# Patient Record
Sex: FEMALE | Race: WHITE | NOT HISPANIC OR LATINO | Employment: FULL TIME | URBAN - METROPOLITAN AREA
[De-identification: names, ages, dates, MRNs, and addresses within clinical notes are randomized per-mention and may not be internally consistent; named-entity substitution may affect disease eponyms.]

---

## 2017-02-03 ENCOUNTER — SKIN CANCER EXAM (OUTPATIENT)
Dept: URBAN - METROPOLITAN AREA CLINIC 31 | Facility: CLINIC | Age: 67
Setting detail: DERMATOLOGY
End: 2017-02-03

## 2017-02-03 ENCOUNTER — RX ONLY (RX ONLY)
Age: 67
End: 2017-02-03

## 2017-02-03 PROCEDURE — 99214 OFFICE O/P EST MOD 30 MIN: CPT

## 2017-02-03 PROCEDURE — 11301 SHAVE SKIN LESION 0.6-1.0 CM: CPT

## 2017-02-03 RX ORDER — CLOBETASOL PROPIONATE 0.5 MG/G
CREAM TOPICAL
Qty: 60 | Refills: 3 | Status: DISCONTINUED
Start: 2017-02-03 | End: 2018-02-09

## 2017-06-10 ENCOUNTER — SEE NOTE (OUTPATIENT)
Dept: URBAN - METROPOLITAN AREA CLINIC 34 | Facility: CLINIC | Age: 67
Setting detail: DERMATOLOGY
End: 2017-06-10

## 2017-06-10 PROCEDURE — 11200 RMVL SKIN TAGS UP TO&INC 15: CPT

## 2017-06-10 PROCEDURE — 11301 SHAVE SKIN LESION 0.6-1.0 CM: CPT

## 2018-02-09 ENCOUNTER — RX ONLY (RX ONLY)
Age: 68
End: 2018-02-09

## 2018-02-09 ENCOUNTER — SKIN CANCER EXAM (OUTPATIENT)
Dept: URBAN - METROPOLITAN AREA CLINIC 31 | Facility: CLINIC | Age: 68
Setting detail: DERMATOLOGY
End: 2018-02-09

## 2018-02-09 PROBLEM — L57.0 ACTINIC KERATOSIS: Status: ACTIVE | Noted: 2018-02-09

## 2018-02-09 PROBLEM — L57.0 ACTINIC KERATOSIS: Status: RESOLVED | Noted: 2018-02-09

## 2018-02-09 PROCEDURE — 99214 OFFICE O/P EST MOD 30 MIN: CPT

## 2018-02-09 PROCEDURE — 17000 DESTRUCT PREMALG LESION: CPT

## 2018-02-09 RX ORDER — CLOBETASOL PROPIONATE 0.5 MG/G
1 APPLICATION CREAM TOPICAL BID
Qty: 60 | Refills: 3
Start: 2018-02-09

## 2018-03-10 ENCOUNTER — WORRISOME GROWTH - SEE NOTE (OUTPATIENT)
Dept: URBAN - METROPOLITAN AREA CLINIC 34 | Facility: CLINIC | Age: 68
Setting detail: DERMATOLOGY
End: 2018-03-10

## 2018-03-10 PROCEDURE — 11301 SHAVE SKIN LESION 0.6-1.0 CM: CPT

## 2018-05-12 ENCOUNTER — FOLLOW UP (OUTPATIENT)
Dept: URBAN - METROPOLITAN AREA CLINIC 34 | Facility: CLINIC | Age: 68
Setting detail: DERMATOLOGY
End: 2018-05-12

## 2018-05-12 PROBLEM — L82.0 INFLAMED SEBORRHEIC KERATOSIS: Status: ACTIVE | Noted: 2018-05-12

## 2018-05-12 PROBLEM — L82.0 INFLAMED SEBORRHEIC KERATOSIS: Status: RESOLVED | Noted: 2018-05-12

## 2018-05-12 PROCEDURE — 17110 DESTRUCT B9 LESION 1-14: CPT

## 2019-02-09 ENCOUNTER — SKIN CANCER EXAM (OUTPATIENT)
Dept: URBAN - METROPOLITAN AREA CLINIC 34 | Facility: CLINIC | Age: 69
Setting detail: DERMATOLOGY
End: 2019-02-09

## 2019-02-09 DIAGNOSIS — L82.0 INFLAMED SEBORRHEIC KERATOSIS: ICD-10-CM

## 2019-02-09 PROBLEM — L82.1 OTHER SEBORRHEIC KERATOSIS: Status: RESOLVED | Noted: 2019-02-09

## 2019-02-09 PROBLEM — L82.1 OTHER SEBORRHEIC KERATOSIS: Status: ACTIVE | Noted: 2019-02-09

## 2019-02-09 PROCEDURE — 99214 OFFICE O/P EST MOD 30 MIN: CPT

## 2019-02-23 ENCOUNTER — SEE NOTE (OUTPATIENT)
Dept: URBAN - METROPOLITAN AREA CLINIC 34 | Facility: CLINIC | Age: 69
Setting detail: DERMATOLOGY
End: 2019-02-23

## 2019-02-23 DIAGNOSIS — B07.9 VIRAL WART, UNSPECIFIED: ICD-10-CM

## 2019-02-23 PROBLEM — L82.0 INFLAMED SEBORRHEIC KERATOSIS: Status: ACTIVE | Noted: 2019-02-23

## 2019-02-23 PROBLEM — L82.0 INFLAMED SEBORRHEIC KERATOSIS: Status: RESOLVED | Noted: 2019-02-23

## 2019-02-23 PROCEDURE — 17110 DESTRUCT B9 LESION 1-14: CPT

## 2019-02-23 PROCEDURE — 11104 PUNCH BX SKIN SINGLE LESION: CPT

## 2019-03-09 ENCOUNTER — RX ONLY (RX ONLY)
Age: 69
End: 2019-03-09

## 2019-03-09 ENCOUNTER — SUTURE REMOVAL (OUTPATIENT)
Dept: URBAN - METROPOLITAN AREA CLINIC 34 | Facility: CLINIC | Age: 69
Setting detail: DERMATOLOGY
End: 2019-03-09

## 2019-03-09 DIAGNOSIS — L57.9 SKIN CHANGES DUE TO CHRONIC EXPOSURE TO NONIONIZING RADIATION, UNSPECIFIED: ICD-10-CM

## 2019-03-09 DIAGNOSIS — D23.9 OTHER BENIGN NEOPLASM OF SKIN, UNSPECIFIED: ICD-10-CM

## 2019-03-09 PROCEDURE — 99213 OFFICE O/P EST LOW 20 MIN: CPT

## 2019-03-09 PROCEDURE — 99024 POSTOP FOLLOW-UP VISIT: CPT

## 2019-03-09 RX ORDER — DESOXIMETASONE 2.5 MG/G
1 APPLICATION CREAM TOPICAL BID
Qty: 100 | Refills: 3 | Status: DISCONTINUED
Start: 2019-03-09 | End: 2019-03-11

## 2019-03-11 ENCOUNTER — RX ONLY (RX ONLY)
Age: 69
End: 2019-03-11

## 2019-03-11 RX ORDER — DESOXIMETASONE 2.5 MG/G
1 APPLICATION CREAM TOPICAL BID
Qty: 100 | Refills: 3
Start: 2019-03-11

## 2019-10-29 ENCOUNTER — APPOINTMENT (RX ONLY)
Dept: URBAN - NONMETROPOLITAN AREA CLINIC 13 | Facility: CLINIC | Age: 69
Setting detail: DERMATOLOGY
End: 2019-10-29

## 2019-10-29 DIAGNOSIS — Z41.9 ENCOUNTER FOR PROCEDURE FOR PURPOSES OTHER THAN REMEDYING HEALTH STATE, UNSPECIFIED: ICD-10-CM

## 2019-10-29 PROCEDURE — ? BOTOX

## 2020-05-04 NOTE — PROCEDURE: BOTOX
Detail Level: Zone
NEPHROLOGIST UPDATED. 

 SEE MD ORDERS/ NOTES.
Inferior Lateral Orbicularis Oculi Units: 0
Show Additional Area 2: Yes
Additional Area 2 Location: masseter, L and R
Additional Area 5 Location: brow
Show Ucl Units: No
Consent: Written consent obtained. Risks include but not limited to lid/brow ptosis, bruising, swelling, diplopia, temporary effect, incomplete chemical denervation.
Additional Area 4 Location: lip upper
Forehead Units: 5
Lot #: 
Expiration Date (Month Year): 2/22
Additional Area 1 Location: vedanimo
Additional Area 6 Location: chin
Post-Care Instructions: Patient instructed to not lie down for 4 hours and limit physical activity for 24 hours. Patient instructed not to travel by airplane for 48 hours.
Dilution (U/0.1 Cc): 1
Additional Area 3 Location: bunny lines

## 2020-07-27 ENCOUNTER — OFFICE VISIT (OUTPATIENT)
Dept: URBAN - METROPOLITAN AREA CLINIC 96 | Facility: CLINIC | Age: 70
End: 2020-07-27

## 2020-07-27 RX ORDER — LISINOPRIL 20 MG/1
TABLET ORAL
Qty: 0 | Refills: 0 | COMMUNITY
Start: 1900-01-01

## 2020-07-27 RX ORDER — MELOXICAM 7.5 MG/1
TABLET ORAL
Qty: 0 | Refills: 0 | COMMUNITY
Start: 1900-01-01

## 2020-07-27 RX ORDER — VALACYCLOVIR HYDROCHLORIDE 500 MG/1
TABLET, FILM COATED ORAL
Qty: 0 | Refills: 0 | COMMUNITY
Start: 1900-01-01

## 2020-07-27 RX ORDER — METFORMIN HYDROCHLORIDE 1000 MG/1
TAKE 1 TABLET (1,000 MG) BY ORAL ROUTE 2 TIMES PER DAY WITH MORNING AND EVENING MEALS TABLET, COATED ORAL 2
Qty: 0 | Refills: 0 | COMMUNITY
Start: 1900-01-01

## 2020-07-27 RX ORDER — LEVOTHYROXINE SODIUM 75 UG/1
TABLET ORAL
Qty: 0 | Refills: 0 | COMMUNITY
Start: 1900-01-01

## 2020-07-27 RX ORDER — ATENOLOL AND CHLORTHALIDONE 100; 25 MG/1; MG/1
TAKE 1 TABLET BY ORAL ROUTE ONCE DAILY TABLET ORAL 1
Qty: 0 | Refills: 0 | COMMUNITY
Start: 1900-01-01

## 2020-07-27 RX ORDER — SIMVASTATIN 20 MG/1
TABLET, FILM COATED ORAL
Qty: 0 | Refills: 0 | COMMUNITY
Start: 1900-01-01

## 2020-07-27 NOTE — HPI-TODAY'S VISIT:
This is a 70-year-old female referred by Dr. Kezia Walsh for dysphasia.  Patient was seen by me in 2016 for a family history of colon cancer as well as anemia.  Her mother  at 52 of colon cancer and a maternal uncle had colon cancer in his 50s.  Pt had a colonoscopy on 2016 and this revealed internal hemorrhoids but otherwise normal exam.  She had an upper endoscopy on the same day due to anemia and heartburn and this showed some mild erythema of the stomach.  Biopsy showed no evidence of celiac disease, chronic inactive gastritis but no H. pylori and esophageal biopsy showed some patchy inflammation.  Patient then underwent a PillCam study on 2016 for completion of her GI work-up and this showed no obvious cause of iron deficiency anemia.  There was a mild submucosal bulge in the distal small bowel but the significance of this was unclear.  Patient underwent a CT enterography on 2016 to evaluate the PillCam study finding and this showed no abnormalities of the small bowel.  She also underwent an MRA and MRI of the abdomen on 2017 because of abnormal liver tests and a liver lesion seen on an outside CAT scan and this showed a hemangioma the liver as well as a tiny cyst in the liver but no suspicious lesions.

## 2020-11-09 ENCOUNTER — LAB OUTSIDE AN ENCOUNTER (OUTPATIENT)
Dept: URBAN - METROPOLITAN AREA CLINIC 96 | Facility: CLINIC | Age: 70
End: 2020-11-09

## 2020-11-09 ENCOUNTER — OFFICE VISIT (OUTPATIENT)
Dept: URBAN - METROPOLITAN AREA CLINIC 96 | Facility: CLINIC | Age: 70
End: 2020-11-09
Payer: MEDICARE

## 2020-11-09 DIAGNOSIS — R13.10 ESOPHAGEAL DYSPHAGIA: ICD-10-CM

## 2020-11-09 DIAGNOSIS — Z80.0 FAMILY HISTORY OF COLON CANCER: ICD-10-CM

## 2020-11-09 DIAGNOSIS — D18.03 LIVER HEMANGIOMA: ICD-10-CM

## 2020-11-09 DIAGNOSIS — R19.7 DIARRHEA, UNSPECIFIED TYPE: ICD-10-CM

## 2020-11-09 DIAGNOSIS — K59.00 CONSTIPATION, UNSPECIFIED CONSTIPATION TYPE: ICD-10-CM

## 2020-11-09 DIAGNOSIS — Z86.2 HISTORY OF ANEMIA: ICD-10-CM

## 2020-11-09 DIAGNOSIS — K21.9 GASTROESOPHAGEAL REFLUX DISEASE WITHOUT ESOPHAGITIS: ICD-10-CM

## 2020-11-09 DIAGNOSIS — R19.4 CHANGE IN BOWEL HABITS: ICD-10-CM

## 2020-11-09 DIAGNOSIS — Z86.73 HISTORY OF STROKE: ICD-10-CM

## 2020-11-09 PROCEDURE — 3017F COLORECTAL CA SCREEN DOC REV: CPT | Performed by: INTERNAL MEDICINE

## 2020-11-09 PROCEDURE — G8420 CALC BMI NORM PARAMETERS: HCPCS | Performed by: INTERNAL MEDICINE

## 2020-11-09 PROCEDURE — G8427 DOCREV CUR MEDS BY ELIG CLIN: HCPCS | Performed by: INTERNAL MEDICINE

## 2020-11-09 PROCEDURE — G8482 FLU IMMUNIZE ORDER/ADMIN: HCPCS | Performed by: INTERNAL MEDICINE

## 2020-11-09 PROCEDURE — G9903 PT SCRN TBCO ID AS NON USER: HCPCS | Performed by: INTERNAL MEDICINE

## 2020-11-09 PROCEDURE — 99204 OFFICE O/P NEW MOD 45 MIN: CPT | Performed by: INTERNAL MEDICINE

## 2020-11-09 RX ORDER — METFORMIN HYDROCHLORIDE 1000 MG/1
TAKE 1 TABLET (1,000 MG) BY ORAL ROUTE 2 TIMES PER DAY WITH MORNING AND EVENING MEALS TABLET, COATED ORAL 2
Qty: 0 | Refills: 0 | Status: ACTIVE | COMMUNITY
Start: 1900-01-01

## 2020-11-09 RX ORDER — VALACYCLOVIR HYDROCHLORIDE 500 MG/1
TABLET, FILM COATED ORAL
Qty: 0 | Refills: 0 | Status: ACTIVE | COMMUNITY
Start: 1900-01-01

## 2020-11-09 RX ORDER — MELOXICAM 7.5 MG/1
TABLET ORAL
Qty: 0 | Refills: 0 | Status: ACTIVE | COMMUNITY
Start: 1900-01-01

## 2020-11-09 RX ORDER — MELOXICAM 7.5 MG/1
1 TABLET TABLET ORAL ONCE A DAY
Status: DISCONTINUED | COMMUNITY

## 2020-11-09 RX ORDER — ONDANSETRON HYDROCHLORIDE 4 MG/1
1 TABLET AS NEEDED TABLET, FILM COATED ORAL ONCE A DAY
Qty: 2 | Refills: 0 | OUTPATIENT
Start: 2020-11-09

## 2020-11-09 RX ORDER — LEVOTHYROXINE SODIUM 75 UG/1
TABLET ORAL
Qty: 0 | Refills: 0 | Status: ACTIVE | COMMUNITY
Start: 1900-01-01

## 2020-11-09 RX ORDER — MULTIVIT-MIN/IRON/FOLIC ACID/K 18-600-40
AS DIRECTED CAPSULE ORAL
Status: ACTIVE | COMMUNITY

## 2020-11-09 RX ORDER — CITALOPRAM HYDROBROMIDE 10 MG/1
1 TABLET TABLET, FILM COATED ORAL ONCE A DAY
Status: ACTIVE | COMMUNITY

## 2020-11-09 RX ORDER — POLYETHYLENE GLYOCOL 3350, SODIUM CHLORIDE, SODIUM BICARBONATE AND POTASSIUM CHLORIDE 420; 11.2; 5.72; 1.48 G/4L; G/4L; G/4L; G/4L
AS DIRECTED POWDER, FOR SOLUTION NASOGASTRIC; ORAL AS DIRECTED
Qty: 420 GRAM | Refills: 0 | OUTPATIENT
Start: 2020-11-09 | End: 2020-11-10

## 2020-11-09 RX ORDER — SIMVASTATIN 20 MG/1
TABLET, FILM COATED ORAL
Qty: 0 | Refills: 0 | Status: DISCONTINUED | COMMUNITY
Start: 1900-01-01

## 2020-11-09 RX ORDER — CLOPIDOGREL 75 MG/1
1 TABLET TABLET, FILM COATED ORAL ONCE A DAY
Status: ACTIVE | COMMUNITY

## 2020-11-09 RX ORDER — LISINOPRIL 20 MG/1
TABLET ORAL
Qty: 0 | Refills: 0 | Status: ACTIVE | COMMUNITY
Start: 1900-01-01

## 2020-11-09 RX ORDER — ATENOLOL AND CHLORTHALIDONE 100; 25 MG/1; MG/1
TAKE 1 TABLET BY ORAL ROUTE ONCE DAILY TABLET ORAL 1
Qty: 0 | Refills: 0 | Status: DISCONTINUED | COMMUNITY
Start: 1900-01-01

## 2020-11-09 NOTE — HPI-TODAY'S VISIT:
This is a 70-year-old female referred by Dr. Harrison for dysphagia, change in bowels and hx of anemia.  Patient was last seen in the office in January 2017 at which time she had a GI work-up of anemia.  Patient's mother had colon cancer in her early 50s and maternal uncle had colon cancer as well in his 50s.  She was found to be anemic with hemoglobin of 10 and a low MCV of 78.  She also complained of some heartburn.  Patient also had chronic constipation.  Work-up done included an EGD on November 28, 2016 that showed chronic inactive gastritis but no H. pylori and duodenal biopsies were negative.  There was some patchy inflammation of the esophagus on biopsies.  Colonoscopy showed internal hemorrhoids otherwise was normal.  PillCam study done on December 7, 2016 showed a questionable submucosal bulge of the distal small bowel.  To further evaluate this, CT enterography was done on December 27, 2016 that showed a hemangioma of the liver versus adenoma versus FNH.  There was no obvious source of anemia found on the GI work-up.  I went over those results with the patient and her  when she followed up and did recommend an MRI to confirm that the liver lesion on CT was benign.  I also recommended a PPI for GERD and a follow-up colonoscopy in 5 years due to her strong family history of colon cancer.  I also recommended patient return to Dr. Walsh for referral to see hematologist for further work-up of anemia.  MRI of the abdomen with and without contrast was done on March 20, 2017 and revealed small flash filling hemangioma of the liver and a tiny cyst in the left hepatic lobe but no suspicious lesions seen.  Pt had a minor stroke 4 yrs ago and is on plavix. Pt just saw Dr Peralta recently, she is about to start IV iron. Pt was told she needs to have repeat egd/colonscopy. Pt is here however because she finds she does not have trouble swallowing, but she feels like both liquids and solids stick in upper chest area. Pt also c/o n/v for about 6 months that comes and goes. Pt also has change in bowels and has both constipation and diarrhea lately and is not sure why. Pt has some cardiac rhythm issues and sees [facility], [phone]- she sees Dr Edward Mattson ([place]).

## 2020-12-15 ENCOUNTER — OFFICE VISIT (OUTPATIENT)
Dept: URBAN - METROPOLITAN AREA MEDICAL CENTER 28 | Facility: MEDICAL CENTER | Age: 70
End: 2020-12-15
Payer: MEDICARE

## 2020-12-15 DIAGNOSIS — Z80.0 FAMILY HISTORY MALIGNANT NEOPLASM OF BILIARY TRACT: ICD-10-CM

## 2020-12-15 DIAGNOSIS — R13.19 CERVICAL DYSPHAGIA: ICD-10-CM

## 2020-12-15 DIAGNOSIS — D50.9 ANEMIA, IRON DEFICIENCY: ICD-10-CM

## 2020-12-15 DIAGNOSIS — K63.5 BENIGN COLON POLYP: ICD-10-CM

## 2020-12-15 DIAGNOSIS — K31.89 ACQUIRED DEFORMITY OF DUODENUM: ICD-10-CM

## 2020-12-15 PROCEDURE — G9935 CANC NOT DETECTD DURING SRCN: HCPCS | Performed by: INTERNAL MEDICINE

## 2020-12-15 PROCEDURE — 43239 EGD BIOPSY SINGLE/MULTIPLE: CPT | Performed by: INTERNAL MEDICINE

## 2020-12-15 PROCEDURE — 45380 COLONOSCOPY AND BIOPSY: CPT | Performed by: INTERNAL MEDICINE

## 2020-12-16 ENCOUNTER — TELEPHONE ENCOUNTER (OUTPATIENT)
Dept: URBAN - METROPOLITAN AREA CLINIC 96 | Facility: CLINIC | Age: 70
End: 2020-12-16

## 2021-01-05 ENCOUNTER — WEB ENCOUNTER (OUTPATIENT)
Dept: URBAN - METROPOLITAN AREA CLINIC 96 | Facility: CLINIC | Age: 71
End: 2021-01-05

## 2021-01-06 ENCOUNTER — LAB OUTSIDE AN ENCOUNTER (OUTPATIENT)
Dept: URBAN - METROPOLITAN AREA CLINIC 96 | Facility: CLINIC | Age: 71
End: 2021-01-06

## 2021-01-06 ENCOUNTER — OFFICE VISIT (OUTPATIENT)
Dept: URBAN - METROPOLITAN AREA CLINIC 96 | Facility: CLINIC | Age: 71
End: 2021-01-06
Payer: MEDICARE

## 2021-01-06 DIAGNOSIS — K21.9 GASTROESOPHAGEAL REFLUX DISEASE WITHOUT ESOPHAGITIS: ICD-10-CM

## 2021-01-06 DIAGNOSIS — Z80.0 FAMILY HISTORY OF COLON CANCER: ICD-10-CM

## 2021-01-06 DIAGNOSIS — K59.00 CONSTIPATION, UNSPECIFIED CONSTIPATION TYPE: ICD-10-CM

## 2021-01-06 DIAGNOSIS — Z86.73 HISTORY OF STROKE: ICD-10-CM

## 2021-01-06 DIAGNOSIS — Z86.2 HISTORY OF ANEMIA: ICD-10-CM

## 2021-01-06 DIAGNOSIS — D18.03 LIVER HEMANGIOMA: ICD-10-CM

## 2021-01-06 DIAGNOSIS — R19.7 DIARRHEA, UNSPECIFIED TYPE: ICD-10-CM

## 2021-01-06 DIAGNOSIS — R13.10 ESOPHAGEAL DYSPHAGIA: ICD-10-CM

## 2021-01-06 DIAGNOSIS — R19.4 CHANGE IN BOWEL HABITS: ICD-10-CM

## 2021-01-06 DIAGNOSIS — D50.9 IRON DEFICIENCY ANEMIA, UNSPECIFIED IRON DEFICIENCY ANEMIA TYPE: ICD-10-CM

## 2021-01-06 PROCEDURE — G8482 FLU IMMUNIZE ORDER/ADMIN: HCPCS | Performed by: INTERNAL MEDICINE

## 2021-01-06 PROCEDURE — G9903 PT SCRN TBCO ID AS NON USER: HCPCS | Performed by: INTERNAL MEDICINE

## 2021-01-06 PROCEDURE — G8427 DOCREV CUR MEDS BY ELIG CLIN: HCPCS | Performed by: INTERNAL MEDICINE

## 2021-01-06 PROCEDURE — 99204 OFFICE O/P NEW MOD 45 MIN: CPT | Performed by: INTERNAL MEDICINE

## 2021-01-06 PROCEDURE — 3017F COLORECTAL CA SCREEN DOC REV: CPT | Performed by: INTERNAL MEDICINE

## 2021-01-06 PROCEDURE — G8420 CALC BMI NORM PARAMETERS: HCPCS | Performed by: INTERNAL MEDICINE

## 2021-01-06 RX ORDER — ONDANSETRON HYDROCHLORIDE 4 MG/1
1 TABLET AS NEEDED TABLET, FILM COATED ORAL ONCE A DAY
Qty: 2 | Refills: 0 | Status: ACTIVE | COMMUNITY
Start: 2020-11-09

## 2021-01-06 RX ORDER — CLOPIDOGREL 75 MG/1
1 TABLET TABLET, FILM COATED ORAL ONCE A DAY
Status: ACTIVE | COMMUNITY

## 2021-01-06 RX ORDER — LEVOTHYROXINE SODIUM 75 UG/1
TABLET ORAL
Qty: 0 | Refills: 0 | Status: ACTIVE | COMMUNITY
Start: 1900-01-01

## 2021-01-06 RX ORDER — CITALOPRAM HYDROBROMIDE 10 MG/1
1 TABLET TABLET, FILM COATED ORAL ONCE A DAY
Status: ACTIVE | COMMUNITY

## 2021-01-06 RX ORDER — MULTIVIT-MIN/IRON/FOLIC ACID/K 18-600-40
AS DIRECTED CAPSULE ORAL
Status: ACTIVE | COMMUNITY

## 2021-01-06 RX ORDER — LISINOPRIL 20 MG/1
TABLET ORAL
Qty: 0 | Refills: 0 | Status: ACTIVE | COMMUNITY
Start: 1900-01-01

## 2021-01-06 RX ORDER — MELOXICAM 7.5 MG/1
TABLET ORAL
Qty: 0 | Refills: 0 | Status: ACTIVE | COMMUNITY
Start: 1900-01-01

## 2021-01-06 RX ORDER — VALACYCLOVIR HYDROCHLORIDE 500 MG/1
TABLET, FILM COATED ORAL
Qty: 0 | Refills: 0 | Status: ACTIVE | COMMUNITY
Start: 1900-01-01

## 2021-01-06 RX ORDER — METFORMIN HYDROCHLORIDE 1000 MG/1
TAKE 1 TABLET (1,000 MG) BY ORAL ROUTE 2 TIMES PER DAY WITH MORNING AND EVENING MEALS TABLET, COATED ORAL 2
Qty: 0 | Refills: 0 | Status: ACTIVE | COMMUNITY
Start: 1900-01-01

## 2021-01-06 NOTE — HPI-TODAY'S VISIT:
This is a 70-year-old female referred by Dr. Harrison for dysphagia, change in bowels and hx of anemia.  Patient was last seen in the office in January 2017 at which time she had a GI work-up of anemia.  Patient's mother had colon cancer in her early 50s and maternal uncle had colon cancer as well in his 50s.  She was found to be anemic with hemoglobin of 10 and a low MCV of 78.  She also complained of some heartburn.  Patient also had chronic constipation.  Work-up done included an EGD on November 28, 2016 that showed chronic inactive gastritis but no H. pylori and duodenal biopsies were negative.  There was some patchy inflammation of the esophagus on biopsies.  Colonoscopy showed internal hemorrhoids otherwise was normal.  PillCam study done on December 7, 2016 showed a questionable submucosal bulge of the distal small bowel.  To further evaluate this, CT enterography was done on December 27, 2016 that showed a hemangioma of the liver versus adenoma versus FNH.  There was no obvious source of anemia found on the GI work-up.  I went over those results with the patient and her  when she followed up and did recommend an MRI to confirm that the liver lesion on CT was benign.  I also recommended a PPI for GERD and a follow-up colonoscopy in 5 years due to her strong family history of colon cancer.  I also recommended patient return to Dr. Walsh for referral to see hematologist for further work-up of anemia.  MRI of the abdomen with and without contrast was done on March 20, 2017 and revealed small flash filling hemangioma of the liver and a tiny cyst in the left hepatic lobe but no suspicious lesions seen. Pt had a minor stroke 4 yrs ago and is on plavix. Pt just saw Dr Peralta recently, she is about to start IV iron. Pt was told she needs to have repeat egd/colonscopy. Pt is here however because she finds she does not have trouble swallowing, but she feels like both liquids and solids stick in upper chest area. Pt also c/o n/v for about 6 months that comes and goes. Pt also has change in bowels and has both constipation and diarrhea lately and is not sure why. Pt has some cardiac rhythm issues and sees [facility], [phone]- she sees Dr Leon "Francisca" Srinivasan.  Pt had colon and EGD on 12/15/20. Colon- sm int hemorrhoids, one 4mm transverse colon polyp removed and redundant colon. EGD- small 1-2cm hh, antral gastritis. Biopsies- no celiac, reactive gastropathy no H pylori, neg esophageal biopsies, hyperplastic colon polyp. F/u colon rec in 12/2025. Pt has had 2 iron infusions since last visit. Will have bloodwork with Dr Harrison next week. Pt denies any reflux or heartburn. Pt has constipation long term but no worse on iron. No diarrhea. No wt loss, no n/v or loss os appetite. She had her stroke 4 yrs ago and does not eat as much as she did.

## 2021-02-27 ENCOUNTER — SKIN CANCER EXAM (OUTPATIENT)
Dept: URBAN - METROPOLITAN AREA CLINIC 34 | Facility: CLINIC | Age: 71
Setting detail: DERMATOLOGY
End: 2021-02-27

## 2021-02-27 DIAGNOSIS — L29.8 OTHER PRURITUS: ICD-10-CM

## 2021-02-27 DIAGNOSIS — L30.4 ERYTHEMA INTERTRIGO: ICD-10-CM

## 2021-02-27 DIAGNOSIS — D22.5 MELANOCYTIC NEVI OF TRUNK: ICD-10-CM

## 2021-02-27 PROBLEM — L82.0 INFLAMED SEBORRHEIC KERATOSIS: Status: RESOLVED | Noted: 2021-02-27

## 2021-02-27 PROBLEM — L82.0 INFLAMED SEBORRHEIC KERATOSIS: Status: ACTIVE | Noted: 2021-02-27

## 2021-02-27 PROBLEM — L82.1 OTHER SEBORRHEIC KERATOSIS: Status: RESOLVED | Noted: 2021-02-27

## 2021-02-27 PROBLEM — L82.1 OTHER SEBORRHEIC KERATOSIS: Status: ACTIVE | Noted: 2021-02-27

## 2021-02-27 PROCEDURE — 11200 RMVL SKIN TAGS UP TO&INC 15: CPT

## 2021-02-27 PROCEDURE — 17110 DESTRUCT B9 LESION 1-14: CPT

## 2021-02-27 PROCEDURE — 11301 SHAVE SKIN LESION 0.6-1.0 CM: CPT

## 2021-02-27 PROCEDURE — 99213 OFFICE O/P EST LOW 20 MIN: CPT

## 2022-03-05 ENCOUNTER — SKIN CANCER EXAM (OUTPATIENT)
Dept: URBAN - METROPOLITAN AREA CLINIC 34 | Facility: CLINIC | Age: 72
Setting detail: DERMATOLOGY
End: 2022-03-05

## 2022-03-05 DIAGNOSIS — L82.0 INFLAMED SEBORRHEIC KERATOSIS: ICD-10-CM

## 2022-03-05 PROBLEM — L82.1 OTHER SEBORRHEIC KERATOSIS: Status: ACTIVE | Noted: 2022-03-05

## 2022-03-05 PROBLEM — L82.1 OTHER SEBORRHEIC KERATOSIS: Status: RESOLVED | Noted: 2022-03-05

## 2022-03-05 PROCEDURE — 99213 OFFICE O/P EST LOW 20 MIN: CPT

## 2022-09-26 ENCOUNTER — WEB ENCOUNTER (OUTPATIENT)
Dept: URBAN - METROPOLITAN AREA CLINIC 96 | Facility: CLINIC | Age: 72
End: 2022-09-26

## 2022-09-26 ENCOUNTER — OFFICE VISIT (OUTPATIENT)
Dept: URBAN - METROPOLITAN AREA CLINIC 96 | Facility: CLINIC | Age: 72
End: 2022-09-26
Payer: MEDICARE

## 2022-09-26 VITALS
HEIGHT: 65 IN | TEMPERATURE: 97.9 F | DIASTOLIC BLOOD PRESSURE: 87 MMHG | BODY MASS INDEX: 23.99 KG/M2 | HEART RATE: 75 BPM | SYSTOLIC BLOOD PRESSURE: 148 MMHG | WEIGHT: 144 LBS

## 2022-09-26 DIAGNOSIS — R19.7 DIARRHEA, UNSPECIFIED TYPE: ICD-10-CM

## 2022-09-26 DIAGNOSIS — D18.03 LIVER HEMANGIOMA: ICD-10-CM

## 2022-09-26 DIAGNOSIS — Z86.2 HISTORY OF ANEMIA: ICD-10-CM

## 2022-09-26 DIAGNOSIS — K21.9 GASTROESOPHAGEAL REFLUX DISEASE WITHOUT ESOPHAGITIS: ICD-10-CM

## 2022-09-26 DIAGNOSIS — K59.00 CONSTIPATION, UNSPECIFIED CONSTIPATION TYPE: ICD-10-CM

## 2022-09-26 DIAGNOSIS — D50.9 IRON DEFICIENCY ANEMIA, UNSPECIFIED IRON DEFICIENCY ANEMIA TYPE: ICD-10-CM

## 2022-09-26 DIAGNOSIS — R13.10 ESOPHAGEAL DYSPHAGIA: ICD-10-CM

## 2022-09-26 DIAGNOSIS — R19.4 CHANGE IN BOWEL HABITS: ICD-10-CM

## 2022-09-26 DIAGNOSIS — Z86.73 HISTORY OF STROKE: ICD-10-CM

## 2022-09-26 DIAGNOSIS — Z80.0 FAMILY HISTORY OF COLON CANCER: ICD-10-CM

## 2022-09-26 PROBLEM — 275538002: Status: ACTIVE | Noted: 2020-11-08

## 2022-09-26 PROBLEM — 266435005: Status: ACTIVE | Noted: 2020-11-08

## 2022-09-26 PROBLEM — 40890009: Status: ACTIVE | Noted: 2020-11-08

## 2022-09-26 PROBLEM — 87522002: Status: ACTIVE | Noted: 2021-01-06

## 2022-09-26 PROCEDURE — 99204 OFFICE O/P NEW MOD 45 MIN: CPT | Performed by: INTERNAL MEDICINE

## 2022-09-26 RX ORDER — AMLODIPINE BESYLATE 5 MG/1
1 TABLET TABLET ORAL ONCE A DAY
Status: ACTIVE | COMMUNITY

## 2022-09-26 RX ORDER — ONDANSETRON HYDROCHLORIDE 4 MG/1
1 TABLET AS NEEDED TABLET, FILM COATED ORAL ONCE A DAY
Qty: 2 | Refills: 0 | Status: ON HOLD | COMMUNITY
Start: 2020-11-09

## 2022-09-26 RX ORDER — MULTIVIT-MIN/IRON/FOLIC ACID/K 18-600-40
AS DIRECTED CAPSULE ORAL
Status: ACTIVE | COMMUNITY

## 2022-09-26 RX ORDER — CLOPIDOGREL 75 MG/1
1 TABLET TABLET, FILM COATED ORAL ONCE A DAY
Status: ACTIVE | COMMUNITY

## 2022-09-26 RX ORDER — CHOLECALCIFEROL (VITAMIN D3) 50 MCG
1 TABLET TABLET ORAL ONCE A DAY
Refills: 0 | Status: ACTIVE | COMMUNITY
Start: 1900-01-01

## 2022-09-26 RX ORDER — EMPAGLIFLOZIN 10 MG/1
1 TABLET TABLET, FILM COATED ORAL ONCE A DAY
Status: ACTIVE | COMMUNITY

## 2022-09-26 RX ORDER — LISINOPRIL 20 MG/1
TABLET ORAL
Qty: 0 | Refills: 0 | Status: ACTIVE | COMMUNITY
Start: 1900-01-01

## 2022-09-26 RX ORDER — CALCIUM CARBONATE 500(1250)
1 TABLET WITH MEALS TABLET ORAL ONCE A DAY
Status: ACTIVE | COMMUNITY

## 2022-09-26 RX ORDER — VALACYCLOVIR HYDROCHLORIDE 500 MG/1
TABLET, FILM COATED ORAL
Qty: 0 | Refills: 0 | Status: ACTIVE | COMMUNITY
Start: 1900-01-01

## 2022-09-26 RX ORDER — METFORMIN HYDROCHLORIDE 1000 MG/1
TAKE 1 TABLET (1,000 MG) BY ORAL ROUTE 2 TIMES PER DAY WITH MORNING AND EVENING MEALS TABLET, COATED ORAL 2
Qty: 0 | Refills: 0 | Status: ACTIVE | COMMUNITY
Start: 1900-01-01

## 2022-09-26 RX ORDER — LEVOTHYROXINE SODIUM 75 UG/1
TABLET ORAL
Qty: 0 | Refills: 0 | Status: ACTIVE | COMMUNITY
Start: 1900-01-01

## 2022-09-26 NOTE — HPI-TODAY'S VISIT:
This is a 72-year-old female referred by Dr. Harrison for diarrhea.  Patient was seen in the office in 2021 and prior in January 2017 at which time she had a GI work-up of anemia.  Patient's mother had colon cancer in her early 50s and maternal uncle had colon cancer as well in his 50s.  She was found to be anemic with hemoglobin of 10 and a low MCV of 78.  She also complained of some heartburn.  Patient also had chronic constipation.  Work-up done included an EGD on November 28, 2016 that showed chronic inactive gastritis but no H. pylori and duodenal biopsies were negative.  There was some patchy inflammation of the esophagus on biopsies.  Colonoscopy showed internal hemorrhoids otherwise was normal.  PillCam study done on December 7, 2016 showed a questionable submucosal bulge of the distal small bowel.  To further evaluate this, CT enterography was done on December 27, 2016 that showed a hemangioma of the liver versus adenoma versus FNH.  There was no obvious source of anemia found on the GI work-up.  I went over those results with the patient and her  when she followed up and did recommend an MRI to confirm that the liver lesion on CT was benign.  I also recommended a PPI for GERD and a follow-up colonoscopy in 5 years due to her strong family history of colon cancer.  I also recommended patient return to Dr. Walsh for referral to see hematologist for further work-up of anemia.  MRI of the abdomen with and without contrast was done on March 20, 2017 and revealed small flash filling hemangioma of the liver and a tiny cyst in the left hepatic lobe but no suspicious lesions seen. Pt had a minor stroke 4 yrs ago and is on plavix. Pt just saw Dr Peralta recently, she is about to start IV iron. Pt was told she needs to have repeat egd/colonscopy. Pt is here however because she finds she does not have trouble swallowing, but she feels like both liquids and solids stick in upper chest area. Pt also c/o n/v for about 6 months that comes and goes. Pt also has change in bowels and has both constipation and diarrhea lately and is not sure why. Pt has some cardiac rhythm issues and sees [facility], [phone]- she sees Dr Leon "Francisca" Srinivasan.  Pt had colon and EGD on 12/15/20. Colon- sm int hemorrhoids, one 4mm transverse colon polyp removed and redundant colon. EGD- small 1-2cm hh, antral gastritis. Biopsies- no celiac, reactive gastropathy no H pylori, neg esophageal biopsies, hyperplastic colon polyp. F/u colon rec in 12/2025. Pt denies any reflux or heartburn. Pt has constipation long term but no worse on iron. No diarrhea. No wt loss, no n/v or loss os appetite. She had her stroke 4 yrs ago and does not eat as much as she did.  Pt last seen in 1/2021. Lsat Friday had diarrhea so had to delay a trip. This persisted for several days. Sunday stool was elongated. Then had pellets and then diarrhea again.  This past Sunday had long stool.

## 2022-10-31 ENCOUNTER — TELEPHONE ENCOUNTER (OUTPATIENT)
Dept: URBAN - METROPOLITAN AREA CLINIC 96 | Facility: CLINIC | Age: 72
End: 2022-10-31

## 2022-10-31 ENCOUNTER — WEB ENCOUNTER (OUTPATIENT)
Dept: URBAN - METROPOLITAN AREA CLINIC 98 | Facility: CLINIC | Age: 72
End: 2022-10-31

## 2022-10-31 ENCOUNTER — OFFICE VISIT (OUTPATIENT)
Dept: URBAN - METROPOLITAN AREA CLINIC 98 | Facility: CLINIC | Age: 72
End: 2022-10-31
Payer: MEDICARE

## 2022-10-31 ENCOUNTER — LAB OUTSIDE AN ENCOUNTER (OUTPATIENT)
Dept: URBAN - METROPOLITAN AREA CLINIC 98 | Facility: CLINIC | Age: 72
End: 2022-10-31

## 2022-10-31 ENCOUNTER — DASHBOARD ENCOUNTERS (OUTPATIENT)
Age: 72
End: 2022-10-31

## 2022-10-31 VITALS — HEIGHT: 65 IN

## 2022-10-31 DIAGNOSIS — R19.7 DIARRHEA, UNSPECIFIED TYPE: ICD-10-CM

## 2022-10-31 DIAGNOSIS — K59.00 CONSTIPATION, UNSPECIFIED CONSTIPATION TYPE: ICD-10-CM

## 2022-10-31 PROBLEM — 14760008: Status: ACTIVE | Noted: 2020-11-09

## 2022-10-31 PROCEDURE — 99213 OFFICE O/P EST LOW 20 MIN: CPT

## 2022-10-31 RX ORDER — CLOPIDOGREL 75 MG/1
1 TABLET TABLET, FILM COATED ORAL ONCE A DAY
Status: ACTIVE | COMMUNITY

## 2022-10-31 RX ORDER — ONDANSETRON HYDROCHLORIDE 4 MG/1
1 TABLET AS NEEDED TABLET, FILM COATED ORAL ONCE A DAY
Qty: 2 | Refills: 0 | Status: ON HOLD | COMMUNITY
Start: 2020-11-09

## 2022-10-31 RX ORDER — EMPAGLIFLOZIN 10 MG/1
1 TABLET TABLET, FILM COATED ORAL ONCE A DAY
Status: ACTIVE | COMMUNITY

## 2022-10-31 RX ORDER — CHOLECALCIFEROL (VITAMIN D3) 50 MCG
1 TABLET TABLET ORAL ONCE A DAY
Refills: 0 | Status: ACTIVE | COMMUNITY
Start: 1900-01-01

## 2022-10-31 RX ORDER — AMLODIPINE BESYLATE 5 MG/1
1 TABLET TABLET ORAL ONCE A DAY
Status: ACTIVE | COMMUNITY

## 2022-10-31 RX ORDER — MULTIVIT-MIN/IRON/FOLIC ACID/K 18-600-40
AS DIRECTED CAPSULE ORAL
Status: ACTIVE | COMMUNITY

## 2022-10-31 RX ORDER — LEVOTHYROXINE SODIUM 75 UG/1
TABLET ORAL
Qty: 0 | Refills: 0 | Status: ACTIVE | COMMUNITY
Start: 1900-01-01

## 2022-10-31 RX ORDER — LISINOPRIL 20 MG/1
TABLET ORAL
Qty: 0 | Refills: 0 | Status: ACTIVE | COMMUNITY
Start: 1900-01-01

## 2022-10-31 RX ORDER — METFORMIN HYDROCHLORIDE 1000 MG/1
TAKE 1 TABLET (1,000 MG) BY ORAL ROUTE 2 TIMES PER DAY WITH MORNING AND EVENING MEALS TABLET, COATED ORAL 2
Qty: 0 | Refills: 0 | Status: ACTIVE | COMMUNITY
Start: 1900-01-01

## 2022-10-31 RX ORDER — CALCIUM CARBONATE 500(1250)
1 TABLET WITH MEALS TABLET ORAL ONCE A DAY
Status: ACTIVE | COMMUNITY

## 2022-10-31 RX ORDER — VALACYCLOVIR HYDROCHLORIDE 500 MG/1
TABLET, FILM COATED ORAL
Qty: 0 | Refills: 0 | Status: ACTIVE | COMMUNITY
Start: 1900-01-01

## 2022-10-31 NOTE — HPI-TODAY'S VISIT:
Patient is a 72-year-old female who presents via telehealth to discuss diarrhea.   Last seen by Dr. Andrew on 9/26/2022.   EGD and colonoscopy completed 12/15/2020.  Colonoscopy revealed small internal hemorrhoids, 1 4 mm transverse colon polyp which was removed and also redundant colon.  Hyperplastic colon polyp.  Recommended repeat colonoscopy in 12/2025. Diarrhea Initially started in September while on vacation- had diarrhea for full 10 days  Described diarrhea as "complete water" Then consulted with Dr. Andrew and patient reported stools were forming up but now "hard pellets"  She states she did not complete stool studies ordered at last visit as she was concerned "about the ruled regarding providing the sample"  Had COVID 2 weeks ago - First couple days of COVID had diarrhea but then improved but had diarrhea yesterday and today  She endorses this is abnormal for her as she typically has constipation Will have a BM every 5 days with associated straining Does not take anything daily but "eats mexican food for a ocuple days to induce BM"  Denies incontinence  Lost a couple pounds since COVID but otherwise denies unintentinal weight loss

## 2022-11-09 ENCOUNTER — LAB OUTSIDE AN ENCOUNTER (OUTPATIENT)
Dept: URBAN - METROPOLITAN AREA CLINIC 96 | Facility: CLINIC | Age: 72
End: 2022-11-09

## 2022-11-10 ENCOUNTER — WEB ENCOUNTER (OUTPATIENT)
Dept: URBAN - METROPOLITAN AREA CLINIC 98 | Facility: CLINIC | Age: 72
End: 2022-11-10